# Patient Record
(demographics unavailable — no encounter records)

---

## 2025-07-24 NOTE — HISTORY OF PRESENT ILLNESS
[FreeTextEntry1] : 64 year old male presents to the office for a c/o 3-4 weeks frequency and dysuria  Went to PCP and received antibiotics (cefuroxime 500mg [id number]) - symptoms have improved  Similar symptoms last year and seen blood with his own eyes at that time and had ultrasound done Denies straining and weak stream He is on tamsulosin 0.4mg with no adverse reactions noted  He was not evaluated at the time he saw blood in his urine   LUTs frequency, nocturia x2-3, feeling of retention, hesitancy, dribbling

## 2025-07-24 NOTE — HISTORY OF PRESENT ILLNESS
[FreeTextEntry1] : 64 year old male presents to the office for a c/o 3-4 weeks frequency and dysuria  Went to PCP and received antibiotics (cefuroxime 500mg [id number]) - symptoms have improved  Similar symptoms last year and seen blood with his own eyes at that time and had ultrasound done Denies straining and weak stream He is on tamsulosin 0.4mg with no adverse reactions noted  He was not evaluated at the time he saw blood in his urine   LUTs frequency, nocturia x2-3, feeling of retention, hesitancy, dribbling

## 2025-07-24 NOTE — ASSESSMENT
[FreeTextEntry1] : Patient also has a diagnosis of BPH with lower urinary tract symptoms.  As well as will be assessed through this evaluation.